# Patient Record
Sex: FEMALE | Race: WHITE | NOT HISPANIC OR LATINO | URBAN - METROPOLITAN AREA
[De-identification: names, ages, dates, MRNs, and addresses within clinical notes are randomized per-mention and may not be internally consistent; named-entity substitution may affect disease eponyms.]

---

## 2020-07-14 ENCOUNTER — INPATIENT (INPATIENT)
Facility: HOSPITAL | Age: 85
LOS: 2 days | Discharge: HOME | DRG: 312 | End: 2020-07-17
Attending: FAMILY MEDICINE | Admitting: FAMILY MEDICINE
Payer: MEDICARE

## 2020-07-14 VITALS
HEART RATE: 63 BPM | OXYGEN SATURATION: 100 % | RESPIRATION RATE: 16 BRPM | DIASTOLIC BLOOD PRESSURE: 87 MMHG | HEIGHT: 62 IN | TEMPERATURE: 98 F | SYSTOLIC BLOOD PRESSURE: 167 MMHG | WEIGHT: 130.07 LBS

## 2020-07-14 DIAGNOSIS — R41.82 ALTERED MENTAL STATUS, UNSPECIFIED: ICD-10-CM

## 2020-07-14 LAB
ALBUMIN SERPL ELPH-MCNC: 3.1 G/DL — LOW (ref 3.3–5)
ALP SERPL-CCNC: 80 U/L — SIGNIFICANT CHANGE UP (ref 40–120)
ALT FLD-CCNC: 20 U/L — SIGNIFICANT CHANGE UP (ref 12–78)
ANION GAP SERPL CALC-SCNC: 6 MMOL/L — SIGNIFICANT CHANGE UP (ref 5–17)
APTT BLD: 26.4 SEC — LOW (ref 27.5–35.5)
AST SERPL-CCNC: 20 U/L — SIGNIFICANT CHANGE UP (ref 15–37)
BASOPHILS # BLD AUTO: 0.05 K/UL — SIGNIFICANT CHANGE UP (ref 0–0.2)
BASOPHILS NFR BLD AUTO: 0.5 % — SIGNIFICANT CHANGE UP (ref 0–2)
BILIRUB SERPL-MCNC: 0.3 MG/DL — SIGNIFICANT CHANGE UP (ref 0.2–1.2)
BUN SERPL-MCNC: 22 MG/DL — SIGNIFICANT CHANGE UP (ref 7–23)
CALCIUM SERPL-MCNC: 8.7 MG/DL — SIGNIFICANT CHANGE UP (ref 8.5–10.1)
CHLORIDE SERPL-SCNC: 104 MMOL/L — SIGNIFICANT CHANGE UP (ref 96–108)
CO2 SERPL-SCNC: 24 MMOL/L — SIGNIFICANT CHANGE UP (ref 22–31)
CREAT SERPL-MCNC: 1.04 MG/DL — SIGNIFICANT CHANGE UP (ref 0.5–1.3)
D DIMER BLD IA.RAPID-MCNC: <150 NG/ML DDU — SIGNIFICANT CHANGE UP
EOSINOPHIL # BLD AUTO: 0.16 K/UL — SIGNIFICANT CHANGE UP (ref 0–0.5)
EOSINOPHIL NFR BLD AUTO: 1.5 % — SIGNIFICANT CHANGE UP (ref 0–6)
ETHANOL SERPL-MCNC: <10 MG/DL — SIGNIFICANT CHANGE UP (ref 0–10)
GLUCOSE SERPL-MCNC: 114 MG/DL — HIGH (ref 70–99)
HCT VFR BLD CALC: 32.3 % — LOW (ref 34.5–45)
HGB BLD-MCNC: 10.7 G/DL — LOW (ref 11.5–15.5)
IMM GRANULOCYTES NFR BLD AUTO: 1 % — SIGNIFICANT CHANGE UP (ref 0–1.5)
INR BLD: 1.02 RATIO — SIGNIFICANT CHANGE UP (ref 0.88–1.16)
LYMPHOCYTES # BLD AUTO: 2.64 K/UL — SIGNIFICANT CHANGE UP (ref 1–3.3)
LYMPHOCYTES # BLD AUTO: 25 % — SIGNIFICANT CHANGE UP (ref 13–44)
MCHC RBC-ENTMCNC: 28.9 PG — SIGNIFICANT CHANGE UP (ref 27–34)
MCHC RBC-ENTMCNC: 33.1 GM/DL — SIGNIFICANT CHANGE UP (ref 32–36)
MCV RBC AUTO: 87.3 FL — SIGNIFICANT CHANGE UP (ref 80–100)
MONOCYTES # BLD AUTO: 0.9 K/UL — SIGNIFICANT CHANGE UP (ref 0–0.9)
MONOCYTES NFR BLD AUTO: 8.5 % — SIGNIFICANT CHANGE UP (ref 2–14)
NEUTROPHILS # BLD AUTO: 6.72 K/UL — SIGNIFICANT CHANGE UP (ref 1.8–7.4)
NEUTROPHILS NFR BLD AUTO: 63.5 % — SIGNIFICANT CHANGE UP (ref 43–77)
PLATELET # BLD AUTO: 295 K/UL — SIGNIFICANT CHANGE UP (ref 150–400)
POTASSIUM SERPL-MCNC: 4.9 MMOL/L — SIGNIFICANT CHANGE UP (ref 3.5–5.3)
POTASSIUM SERPL-SCNC: 4.9 MMOL/L — SIGNIFICANT CHANGE UP (ref 3.5–5.3)
PROT SERPL-MCNC: 6.4 GM/DL — SIGNIFICANT CHANGE UP (ref 6–8.3)
PROTHROM AB SERPL-ACNC: 11.8 SEC — SIGNIFICANT CHANGE UP (ref 10.6–13.6)
RBC # BLD: 3.7 M/UL — LOW (ref 3.8–5.2)
RBC # FLD: 13.8 % — SIGNIFICANT CHANGE UP (ref 10.3–14.5)
SODIUM SERPL-SCNC: 134 MMOL/L — LOW (ref 135–145)
TROPONIN I SERPL-MCNC: <0.015 NG/ML — SIGNIFICANT CHANGE UP (ref 0.01–0.04)
WBC # BLD: 10.58 K/UL — HIGH (ref 3.8–10.5)
WBC # FLD AUTO: 10.58 K/UL — HIGH (ref 3.8–10.5)

## 2020-07-14 PROCEDURE — 99223 1ST HOSP IP/OBS HIGH 75: CPT

## 2020-07-14 PROCEDURE — 86900 BLOOD TYPING SEROLOGIC ABO: CPT

## 2020-07-14 PROCEDURE — 80048 BASIC METABOLIC PNL TOTAL CA: CPT

## 2020-07-14 PROCEDURE — 76770 US EXAM ABDO BACK WALL COMP: CPT

## 2020-07-14 PROCEDURE — 70450 CT HEAD/BRAIN W/O DYE: CPT | Mod: 26

## 2020-07-14 PROCEDURE — 93005 ELECTROCARDIOGRAM TRACING: CPT

## 2020-07-14 PROCEDURE — 93306 TTE W/DOPPLER COMPLETE: CPT

## 2020-07-14 PROCEDURE — 36415 COLL VENOUS BLD VENIPUNCTURE: CPT

## 2020-07-14 PROCEDURE — C9113: CPT

## 2020-07-14 PROCEDURE — 86850 RBC ANTIBODY SCREEN: CPT

## 2020-07-14 PROCEDURE — 87040 BLOOD CULTURE FOR BACTERIA: CPT

## 2020-07-14 PROCEDURE — 97116 GAIT TRAINING THERAPY: CPT | Mod: GP

## 2020-07-14 PROCEDURE — 86901 BLOOD TYPING SEROLOGIC RH(D): CPT

## 2020-07-14 PROCEDURE — 84484 ASSAY OF TROPONIN QUANT: CPT

## 2020-07-14 PROCEDURE — 85014 HEMATOCRIT: CPT

## 2020-07-14 PROCEDURE — U0003: CPT

## 2020-07-14 PROCEDURE — 81001 URINALYSIS AUTO W/SCOPE: CPT

## 2020-07-14 PROCEDURE — 71045 X-RAY EXAM CHEST 1 VIEW: CPT | Mod: 26

## 2020-07-14 PROCEDURE — 85018 HEMOGLOBIN: CPT

## 2020-07-14 PROCEDURE — 97163 PT EVAL HIGH COMPLEX 45 MIN: CPT | Mod: GP

## 2020-07-14 PROCEDURE — 86769 SARS-COV-2 COVID-19 ANTIBODY: CPT

## 2020-07-14 PROCEDURE — 87086 URINE CULTURE/COLONY COUNT: CPT

## 2020-07-14 PROCEDURE — 71275 CT ANGIOGRAPHY CHEST: CPT | Mod: 26

## 2020-07-14 PROCEDURE — 85027 COMPLETE CBC AUTOMATED: CPT

## 2020-07-14 PROCEDURE — 95819 EEG AWAKE AND ASLEEP: CPT

## 2020-07-14 RX ORDER — AMLODIPINE BESYLATE 2.5 MG/1
5 TABLET ORAL DAILY
Refills: 0 | Status: DISCONTINUED | OUTPATIENT
Start: 2020-07-14 | End: 2020-07-17

## 2020-07-14 RX ORDER — SODIUM CHLORIDE 9 MG/ML
1000 INJECTION INTRAMUSCULAR; INTRAVENOUS; SUBCUTANEOUS
Refills: 0 | Status: DISCONTINUED | OUTPATIENT
Start: 2020-07-14 | End: 2020-07-15

## 2020-07-14 RX ORDER — HYDROCHLOROTHIAZIDE 25 MG
25 TABLET ORAL DAILY
Refills: 0 | Status: DISCONTINUED | OUTPATIENT
Start: 2020-07-14 | End: 2020-07-15

## 2020-07-14 RX ORDER — AMLODIPINE BESYLATE 2.5 MG/1
1 TABLET ORAL
Qty: 0 | Refills: 0 | DISCHARGE

## 2020-07-14 RX ORDER — ALBUTEROL 90 UG/1
2 AEROSOL, METERED ORAL EVERY 6 HOURS
Refills: 0 | Status: DISCONTINUED | OUTPATIENT
Start: 2020-07-14 | End: 2020-07-17

## 2020-07-14 RX ORDER — ATENOLOL 25 MG/1
1 TABLET ORAL
Qty: 0 | Refills: 0 | DISCHARGE

## 2020-07-14 RX ORDER — FLUTICASONE FUROATE, UMECLIDINIUM BROMIDE AND VILANTEROL TRIFENATATE 200; 62.5; 25 UG/1; UG/1; UG/1
1 POWDER RESPIRATORY (INHALATION)
Qty: 0 | Refills: 0 | DISCHARGE

## 2020-07-14 RX ORDER — PANTOPRAZOLE SODIUM 20 MG/1
40 TABLET, DELAYED RELEASE ORAL
Refills: 0 | Status: DISCONTINUED | OUTPATIENT
Start: 2020-07-14 | End: 2020-07-15

## 2020-07-14 RX ORDER — OMEPRAZOLE 10 MG/1
1 CAPSULE, DELAYED RELEASE ORAL
Qty: 0 | Refills: 0 | DISCHARGE

## 2020-07-14 RX ORDER — ATENOLOL 25 MG/1
50 TABLET ORAL
Refills: 0 | Status: DISCONTINUED | OUTPATIENT
Start: 2020-07-14 | End: 2020-07-17

## 2020-07-14 RX ORDER — HEPARIN SODIUM 5000 [USP'U]/ML
5000 INJECTION INTRAVENOUS; SUBCUTANEOUS EVERY 12 HOURS
Refills: 0 | Status: DISCONTINUED | OUTPATIENT
Start: 2020-07-14 | End: 2020-07-15

## 2020-07-14 RX ADMIN — HEPARIN SODIUM 5000 UNIT(S): 5000 INJECTION INTRAVENOUS; SUBCUTANEOUS at 22:22

## 2020-07-14 RX ADMIN — ATENOLOL 50 MILLIGRAM(S): 25 TABLET ORAL at 22:22

## 2020-07-14 RX ADMIN — SODIUM CHLORIDE 100 MILLILITER(S): 9 INJECTION INTRAMUSCULAR; INTRAVENOUS; SUBCUTANEOUS at 19:22

## 2020-07-14 NOTE — H&P ADULT - HISTORY OF PRESENT ILLNESS
89 year old female patient who was recently discharged from [facility] for a UTI and hyponatremia presented to the ED after having a witnessed episode of unresponsiveness that lasted roughly 20 mins according to pt's daughter/HCP(Ej [phone]). Patient noted to have heart rate in the 30s and blood sugar reading of 134 at the time of EMS arrival. No head trauma, tongue biting, mouth foaming complaints of chest pain, palpitations or dizziness noted prior to event. Patient initially confused at the time of ED presentation but found fully A&O x3 and endorses above narrative. States she remembers being with her daughter and son-in-law then waking up in the ED. Patient currently endorses several episodes of loose stools that started in the ED and low abdominal cramping.

## 2020-07-14 NOTE — ED PROVIDER NOTE - PROGRESS NOTE DETAILS
given prolonged period of unresponsiveness with admit for further w/u and monitoring.  Further care per inpatient treatment team.

## 2020-07-14 NOTE — ED ADULT TRIAGE NOTE - CHIEF COMPLAINT QUOTE
Pt was brought in by ambulance for evaluation of syncope with period of unresponsiveness for 20-25 min while getting her hair done at daughters house, upon EMS arrival HR was reported to be 36 and on arrival to ED HR is 64. Blood glucose per EMS is 134, on arrival to ED pt is AMS and asking for daughter, unable to recall events PTA. Denies pain

## 2020-07-14 NOTE — H&P ADULT - ASSESSMENT
89 year old female patient with mental status changes and unresponsive episode        -Admit to Tele        #Concern for syncope  -pt with unresponsive episode and HR in the 30s  -no events noted on tele  -monitor on tele overnight  -get morning echo  -IVF hydration  -check orthostatics  -cardiology to evaluate pt    #Encephalopathy  -DDX: metabolic, infectious, neurologic  -pt with recent uti on abx  -CT head negative for any acute pathology  -currently lucid  -get blood and urine cx  -get EEG  -consider neuro evaluation if confusion returns    #Hyponatremia  -gentle IVF hydration    #Leukocytosis  -no focal signs of infection  -get blood and urine cx    #Anemia  -no baseline hemoglobin   -currently stable    #HTN  -on amlodipine   -on hctz-enalapril    #Debility  -PT evaluation    #Advanced directives  -full code  -HCP to discuss directives with family and notify rounding hospitalist of any changes    #DVT ppx  -heparin sq

## 2020-07-14 NOTE — ED ADULT NURSE NOTE - OBJECTIVE STATEMENT
Pt brought in by ambulance after an episode of unresponsiveness at home.  Upon EMS arrival patient was found to be bradycardic in the 30s but improved in route to normal rate in 60s.  Family called for collateral information.  Pt was recently admitted for ams/hyponatremia.

## 2020-07-14 NOTE — H&P ADULT - NSHPPHYSICALEXAM_GEN_ALL_CORE
Vital Signs Last 24 Hrs  T(C): 37.1 (14 Jul 2020 17:10), Max: 37.1 (14 Jul 2020 17:10)  T(F): 98.7 (14 Jul 2020 17:10), Max: 98.7 (14 Jul 2020 17:10)  HR: 60 (14 Jul 2020 17:10) (60 - 63)  BP: 126/75 (14 Jul 2020 17:10) (126/75 - 167/87)  BP(mean): --  RR: 19 (14 Jul 2020 17:10) (16 - 19)  SpO2: 99% (14 Jul 2020 17:10) (99% - 100%)

## 2020-07-14 NOTE — ED PROVIDER NOTE - PMH
No pertinent past medical history <<----- Click to add NO pertinent Past Medical History TIA (transient ischemic attack)

## 2020-07-14 NOTE — ED PROVIDER NOTE - CLINICAL SUMMARY MEDICAL DECISION MAKING FREE TEXT BOX
Pt presenting with episode of unresponsiveness. now responsive. No obvious evidence fo CVA. Does not have focal deficits currently. Consider syncopal episode, seizure, metabolic encephalopathy. Had recent admission at [facility] for hyponatremia. Consider infectious etiology. Pt not  a TPA candidate given presentation likely not CVA. No CTA at this time. Will check CTA chest given pt SOB and protracted syncopal event. Will dispo pending labs and imaging. Pt presenting with episode of unresponsiveness. now responsive. No obvious evidence of CVA. Does not have focal deficits currently. Consider syncopal episode, seizure, metabolic encephalopathy. Had recent admission at [facility] for hyponatremia. Consider infectious etiology. Pt not  a TPA candidate given presentation likely not CVA. No CTA at this time.  Symptoms rapidly resolving and later at mental baseline. Will check CTA chest given pt SOB and protracted syncopal event possibly. Will dispo pending labs and imaging.

## 2020-07-14 NOTE — ED PROVIDER NOTE - OBJECTIVE STATEMENT
89 y/o female with an unknown PMHx presents to the ED s/p syncope. Pt reports she was in her daughter's backyard getting a haircut and does not remember anything after. Upon EMS arrival, pt was sitting in the chair unresponsive with a HR of 36 and a BGM of 134. Pt still altered in ED. As per daughter, pt recently admitted at [facility] for hyponatremia and UTI and was d/c yesterday. No other complaints at this time. 89 y/o female with a PMHx of TIA 25 years ago presents to the ED s/p syncope. Pt reports she was in her daughter's backyard getting a haircut and does not remember anything after. Upon EMS arrival, pt was sitting in the chair unresponsive with a HR of 36 and a BGM of 134. Pt was unresponsive for about 30 mins. Upon ED arrival, pt initially altered. As per daughter, pt recently admitted at [facility] for hyponatremia and UTI, was d/c yesterday. No other complaints at this time. 89 y/o female with a PMHx of TIA 25 years ago presents to the ED s/p syncope. Pt reports she was in her daughter's backyard getting a haircut and does not remember anything after. Upon EMS arrival, pt was sitting in the chair unresponsive with a HR of 36 and a BGM of 134. Pt was unresponsive for about 30 mins. Upon ED arrival, pt initially altered. Pt c/o SOB. As per daughter, pt recently admitted at [facility] for hyponatremia and UTI, was d/c yesterday. No other complaints at this time.

## 2020-07-14 NOTE — H&P ADULT - NSICDXPASTMEDICALHX_GEN_ALL_CORE_FT
PAST MEDICAL HISTORY:  HTN (hypertension)     Overactive bladder     Transient ischemic attack (TIA)

## 2020-07-15 LAB
ADD ON TEST-SPECIMEN IN LAB: SIGNIFICANT CHANGE UP
APPEARANCE UR: CLEAR — SIGNIFICANT CHANGE UP
BACTERIA # UR AUTO: ABNORMAL
BILIRUB UR-MCNC: NEGATIVE — SIGNIFICANT CHANGE UP
COD CRY URNS QL: ABNORMAL
COLOR SPEC: YELLOW — SIGNIFICANT CHANGE UP
DIFF PNL FLD: ABNORMAL
EPI CELLS # UR: SIGNIFICANT CHANGE UP
GLUCOSE UR QL: NEGATIVE MG/DL — SIGNIFICANT CHANGE UP
HCT VFR BLD CALC: 29.6 % — LOW (ref 34.5–45)
HGB BLD-MCNC: 10.1 G/DL — LOW (ref 11.5–15.5)
KETONES UR-MCNC: NEGATIVE — SIGNIFICANT CHANGE UP
LEUKOCYTE ESTERASE UR-ACNC: ABNORMAL
NITRITE UR-MCNC: POSITIVE
PH UR: 5 — SIGNIFICANT CHANGE UP (ref 5–8)
PROT UR-MCNC: 30 MG/DL
RBC CASTS # UR COMP ASSIST: ABNORMAL /HPF (ref 0–4)
SARS-COV-2 IGG SERPL QL IA: NEGATIVE — SIGNIFICANT CHANGE UP
SARS-COV-2 IGM SERPL IA-ACNC: <0.1 INDEX — SIGNIFICANT CHANGE UP
SARS-COV-2 RNA SPEC QL NAA+PROBE: SIGNIFICANT CHANGE UP
SP GR SPEC: 1.01 — SIGNIFICANT CHANGE UP (ref 1.01–1.02)
TROPONIN I SERPL-MCNC: <0.015 NG/ML — SIGNIFICANT CHANGE UP (ref 0.01–0.04)
UROBILINOGEN FLD QL: 1 MG/DL
WBC UR QL: SIGNIFICANT CHANGE UP

## 2020-07-15 PROCEDURE — 99233 SBSQ HOSP IP/OBS HIGH 50: CPT

## 2020-07-15 PROCEDURE — 93010 ELECTROCARDIOGRAM REPORT: CPT

## 2020-07-15 PROCEDURE — 95819 EEG AWAKE AND ASLEEP: CPT | Mod: 26

## 2020-07-15 RX ORDER — PANTOPRAZOLE SODIUM 20 MG/1
40 TABLET, DELAYED RELEASE ORAL EVERY 12 HOURS
Refills: 0 | Status: DISCONTINUED | OUTPATIENT
Start: 2020-07-15 | End: 2020-07-16

## 2020-07-15 RX ADMIN — ATENOLOL 50 MILLIGRAM(S): 25 TABLET ORAL at 09:59

## 2020-07-15 RX ADMIN — AMLODIPINE BESYLATE 5 MILLIGRAM(S): 2.5 TABLET ORAL at 09:59

## 2020-07-15 RX ADMIN — Medication 10 MILLIGRAM(S): at 09:59

## 2020-07-15 RX ADMIN — PANTOPRAZOLE SODIUM 40 MILLIGRAM(S): 20 TABLET, DELAYED RELEASE ORAL at 18:51

## 2020-07-15 RX ADMIN — Medication 10 MILLIGRAM(S): at 22:31

## 2020-07-15 RX ADMIN — SODIUM CHLORIDE 100 MILLILITER(S): 9 INJECTION INTRAMUSCULAR; INTRAVENOUS; SUBCUTANEOUS at 16:01

## 2020-07-15 RX ADMIN — ATENOLOL 50 MILLIGRAM(S): 25 TABLET ORAL at 22:31

## 2020-07-15 RX ADMIN — PANTOPRAZOLE SODIUM 40 MILLIGRAM(S): 20 TABLET, DELAYED RELEASE ORAL at 12:48

## 2020-07-15 RX ADMIN — Medication 10 MILLIGRAM(S): at 16:01

## 2020-07-15 NOTE — PHYSICAL THERAPY INITIAL EVALUATION ADULT - PERTINENT HX OF CURRENT PROBLEM, REHAB EVAL
Pt admitted to  secondary to unresponsiveness x 20'. When EMS arrived pt with HR in 30's and blood sugar 134. Pt with recent admission to [facility] for UTI and hyponatremia. Pt admitted to  secondary to unresponsiveness x 20'. When EMS arrived pt with HR in 30's and blood sugar 134. Pt with recent admission to [facility] for UTI and hyponatremia. CT angio chest; neg for PE.

## 2020-07-15 NOTE — PHYSICAL THERAPY INITIAL EVALUATION ADULT - PRECAUTIONS/LIMITATIONS, REHAB EVAL
Tele/cardiac precautions fall precautions/cardiac precautions/Tele cardiac precautions/fall precautions/Tele, pt PUI/isolation precautions

## 2020-07-15 NOTE — PROGRESS NOTE ADULT - SUBJECTIVE AND OBJECTIVE BOX
CC: Syncope  Encephalopathy (15 Jul 2020 07:39)    HPI: 89 year old female patient  with PMH of HTN, TIA, Overactive blader  who was recently discharged from [facility] for a UTI and hyponatremia presented to the ED after having a witnessed episode of unresponsiveness that lasted roughly 20 mins according to pt's daughter/ HCP ( Margy [phone]).  Patient  noted to have heart rate in the 30s and blood sugar reading of 134 at the time of EMS arrival. No head trauma, tongue biting, mouth foaming complaints of chest pain, palpitations or dizziness noted prior to event. Patient initially confused at the time of ED presentation but found fully A&O x3 and endorses above narrative. States she remembers being with her daughter and son-in-law then waking up in the ED    INTERVAL HPI/ OVERNIGHT EVENTS: Chart reviewed, Pt was seen and examined, confirms history above, stated that was sitting in the chair when it happened.  Denies any preceding dizziness , lightheadedness or palpitations. Feels better today. Had  episode of rectal bleeding this am, resolved. Had  significant diarrhea yesterday in ED. Denies abd pain     Vital Signs Last 24 Hrs  T(C): 36.6 (15 Jul 2020 08:30), Max: 37.1 (2020 17:10)  T(F): 97.8 (15 Jul 2020 08:30), Max: 98.7 (2020 17:10)  HR: 61 (15 Jul 2020 08:30) (60 - 72)  BP: 145/41 (15 Jul 2020 08:35) (126/75 - 147/61)  RR: 18 (15 Jul 2020 08:30) (16 - 19)  SpO2: 97% (15 Jul 2020 08:30) (97% - 100%)      REVIEW OF SYSTEMS:  All other review of systems is negative unless indicated above.      PHYSICAL EXAM:  General: Well developed; frail elderly female,  in no acute distress  Eyes: PERRLA, EOMI; conjunctiva and sclera clear  Head: Normocephalic; atraumatic  ENMT: No nasal discharge; airway clear  Neck: Supple; non tender; no masses  Respiratory: No wheezes, rales or rhonchi  Cardiovascular: Regular rate and rhythm. S1 and S2 Normal;  + murmurs  Gastrointestinal: Soft non-tender non-distended; Normal bowel sounds  Genitourinary: No  suprapubic  tenderness  Extremities: preserved  range of motion, No  edema  Vascular: Peripheral pulses palpable 2+ bilaterally  Neurological: Alert and oriented x3, non focal   Skin: Warm and dry. No acute rash  Musculoskeletal: Normal muscle tone, no joint tenderness   Psychiatric: Cooperative       LABS:     CARDIAC MARKERS ( 15 Jul 2020 06:31 )  <0.015 ng/mL / x     / x     / x     / x      CARDIAC MARKERS ( 2020 12:50 )  <0.015 ng/mL / x     / x     / x     / x                            10.6   18.13 )-----------( 258      ( 15 Jul 2020 06:31 )             31.8     15 Jul 2020 06:31    136    |  107    |  23     ----------------------------<  100    4.0     |  23     |  1.00     Ca    8.1        15 Jul 2020 06:31    TPro  6.4    /  Alb  3.1    /  TBili  0.3    /  DBili  x      /  AST  20     /  ALT  20     /  AlkPhos  80     2020 12:50  PT/INR - ( 2020 12:50 )   PT: 11.8 sec;   INR: 1.02 ratio    PTT - ( 2020 12:50 )  PTT:26.4 sec  POCT Blood Glucose.: 107 mg/dL (2020 13:09)    LIVER FUNCTIONS - ( 2020 12:50 )  Alb: 3.1 g/dL / Pro: 6.4 gm/dL / ALK PHOS: 80 U/L / ALT: 20 U/L / AST: 20 U/L / GGT: x           Urinalysis Basic - ( 2020 23:45 )  Color: Yellow / Appearance: Clear / S.010 / pH: x  Gluc: x / Ketone: Negative  / Bili: Negative / Urobili: 1 mg/dL   Blood: x / Protein: 30 mg/dL / Nitrite: Positive   Leuk Esterase: Trace / RBC: 6-10 /HPF / WBC 3-5   Sq Epi: x / Non Sq Epi: Few / Bacteria: Moderate        MEDICATIONS  (STANDING):  amLODIPine   Tablet 5 milliGRAM(s) Oral daily  ATENolol  Tablet 50 milliGRAM(s) Oral two times a day  enalapril 10 milliGRAM(s) Oral daily  pantoprazole    Tablet 40 milliGRAM(s) Oral before breakfast  sildenafil (REVATIO) 10 milliGRAM(s) Oral every 8 hours  sodium chloride 0.9%. 1000 milliLiter(s) (100 mL/Hr) IV Continuous <Continuous>    MEDICATIONS  (PRN):  ALBUTerol    90 MICROgram(s) HFA Inhaler 2 Puff(s) Inhalation every 6 hours PRN Shortness of Breath and/or Wheezing      RADIOLOGY & ADDITIONAL TESTS:    EXAM:  CTA CHEST PE PROTOCOL (W)AW IC                        PROCEDURE DATE:  2020    INTERPRETATION:  CLINICAL INFORMATION: Syncope and shortness of breath    COMPARISON: None.  FINDINGS:  LUNGS AND AIRWAYS: Patent central airways.  No suspicious nodule, mass or acute consolidation. Right lower lobe calcified granuloma.  PLEURA: No pleural effusion.  MEDIASTINUM AND PRUDENCIO: No lymphadenopathy. Large hiatal hernia.  VESSELS: Excellent pulmonary arterial opacification. No evidence of embolism. Normal caliber aorta. Atherosclerotic arterial calcifications, including coronary artery calcification.  HEART: Heart size is normal. No pericardial effusion.  CHEST WALL AND LOWER NECK: Within normal limits.  VISUALIZED UPPER ABDOMEN: Within normal limits.  BONES: No acute abnormality.    IMPRESSION:   No pulmonary embolism or other acute chest pathology.        EXAM:  CT BRAIN STROKE PROTOCOL                        PROCEDURE DATE:  2020    INTERPRETATION:      FINDINGS:   No previous examinations are available for review.  The brain demonstrates moderate periventricular and deep white matter ischemia.   No acute cerebral cortical infarct is seen.  No intracranial hemorrhage is found.  No mass effect is found in the brain.    The ventricles, sulci and basal cisterns appear unremarkable.  The orbits are unremarkable.  The paranasal sinuses are clear.  The nasal cavity appears intact.  The nasopharynx is symmetric.  The central skull base, petrous temporal bones and calvarium remain intact.      IMPRESSION:   Moderate periventricular and deep white matter ischemia.

## 2020-07-15 NOTE — PHYSICAL THERAPY INITIAL EVALUATION ADULT - PLANNED THERAPY INTERVENTIONS, PT EVAL
ROM/strengthening/Increase mobility as tolerated. Eval, bed mobility, transfers x 15'./transfer training/bed mobility training

## 2020-07-15 NOTE — PROGRESS NOTE ADULT - ASSESSMENT
89 year old female patient  with PMH of HTN, TIA, Overactive blader     # Syncope, possibly orthostatic vs vasovagal   -pt with unresponsive episode and HR in the 30s at EMS arrival   -tele: SR- SBrady in 50s   -Trop neg x 2   -get morning echo  -Orthostatics positive  - C/w IVF hydration  - Stop HCTZ   -check orthostatics  - ECHO   - PT   - Cardio  eval appreciated     #Encephalopathy, metabolic, at baseline now   -CT head negative for any acute pathology  -IV Fluids   - EEG  - Supportive care   - FAll precautions  - PT eval       #Hyponatremia, improved   -gentle IVF hydration    #Leukocytosis? Unclear  etiology  - WBCs trending up, possibly reactive Vs UTI  - F/u BCx  - Will send UCX   - CT angio no PE or PNA   - monitor for fevers, will watch off abxs     #Anemia with episode of Rectal bleed  Unknown baseline HB  Start PPI IV but likely lower GI bleed  Hold DVT PPX   GI eval   Repeat H/H and Type & screen this afternoon  Transfuse PRN       #HTN  -on amlodipine, enalapril  - Hold HCTZ     #Debility  -PT evaluation    #Advanced directives  -full code  -HCP to discuss directives with family and notify rounding hospitalist of any changes    #DVT ppx  -Venodynes

## 2020-07-15 NOTE — PHYSICAL THERAPY INITIAL EVALUATION ADULT - ACTIVE RANGE OF MOTION EXAMINATION, REHAB EVAL
Left LE Active ROM was WFL (within functional limits)/Right LE Active ROM was WFL (within functional limits)/Right UE Active ROM was WFL (within functional limits)/Left UE Active ROM was WFL (within functional limits)/Bilateral shoulder flex ~ 120 degrees, bilateral hip flex ~ 90 degrees, and bilateral DF neutral./deficits as listed below

## 2020-07-15 NOTE — PHYSICAL THERAPY INITIAL EVALUATION ADULT - LEVEL OF INDEPENDENCE: SUPINE/SIT, REHAB EVAL
minimum assist (75% patients effort)/Pt sat at EOB for ~ 3' and therapist noted blood all over pad. Pt c/o dizziness and encouraged pt back to bed with above assistance. RN informed./contact guard

## 2020-07-16 LAB
ANION GAP SERPL CALC-SCNC: 4 MMOL/L — LOW (ref 5–17)
BUN SERPL-MCNC: 14 MG/DL — SIGNIFICANT CHANGE UP (ref 7–23)
CALCIUM SERPL-MCNC: 8.6 MG/DL — SIGNIFICANT CHANGE UP (ref 8.5–10.1)
CHLORIDE SERPL-SCNC: 106 MMOL/L — SIGNIFICANT CHANGE UP (ref 96–108)
CO2 SERPL-SCNC: 26 MMOL/L — SIGNIFICANT CHANGE UP (ref 22–31)
CREAT SERPL-MCNC: 0.83 MG/DL — SIGNIFICANT CHANGE UP (ref 0.5–1.3)
CULTURE RESULTS: NO GROWTH — SIGNIFICANT CHANGE UP
GLUCOSE SERPL-MCNC: 95 MG/DL — SIGNIFICANT CHANGE UP (ref 70–99)
HCT VFR BLD CALC: 28.8 % — LOW (ref 34.5–45)
HGB BLD-MCNC: 9.6 G/DL — LOW (ref 11.5–15.5)
MCHC RBC-ENTMCNC: 29.1 PG — SIGNIFICANT CHANGE UP (ref 27–34)
MCHC RBC-ENTMCNC: 33.3 GM/DL — SIGNIFICANT CHANGE UP (ref 32–36)
MCV RBC AUTO: 87.3 FL — SIGNIFICANT CHANGE UP (ref 80–100)
PLATELET # BLD AUTO: 222 K/UL — SIGNIFICANT CHANGE UP (ref 150–400)
POTASSIUM SERPL-MCNC: 3.7 MMOL/L — SIGNIFICANT CHANGE UP (ref 3.5–5.3)
POTASSIUM SERPL-SCNC: 3.7 MMOL/L — SIGNIFICANT CHANGE UP (ref 3.5–5.3)
RBC # BLD: 3.3 M/UL — LOW (ref 3.8–5.2)
RBC # FLD: 14.2 % — SIGNIFICANT CHANGE UP (ref 10.3–14.5)
SODIUM SERPL-SCNC: 136 MMOL/L — SIGNIFICANT CHANGE UP (ref 135–145)
SPECIMEN SOURCE: SIGNIFICANT CHANGE UP
WBC # BLD: 20.88 K/UL — HIGH (ref 3.8–10.5)
WBC # FLD AUTO: 20.88 K/UL — HIGH (ref 3.8–10.5)

## 2020-07-16 PROCEDURE — 76770 US EXAM ABDO BACK WALL COMP: CPT | Mod: 26

## 2020-07-16 PROCEDURE — 93010 ELECTROCARDIOGRAM REPORT: CPT

## 2020-07-16 PROCEDURE — 99233 SBSQ HOSP IP/OBS HIGH 50: CPT

## 2020-07-16 RX ADMIN — Medication 10 MILLIGRAM(S): at 10:37

## 2020-07-16 RX ADMIN — Medication 10 MILLIGRAM(S): at 15:29

## 2020-07-16 RX ADMIN — AMLODIPINE BESYLATE 5 MILLIGRAM(S): 2.5 TABLET ORAL at 10:37

## 2020-07-16 RX ADMIN — Medication 10 MILLIGRAM(S): at 22:18

## 2020-07-16 RX ADMIN — ATENOLOL 50 MILLIGRAM(S): 25 TABLET ORAL at 10:37

## 2020-07-16 RX ADMIN — PANTOPRAZOLE SODIUM 40 MILLIGRAM(S): 20 TABLET, DELAYED RELEASE ORAL at 05:17

## 2020-07-16 RX ADMIN — ATENOLOL 50 MILLIGRAM(S): 25 TABLET ORAL at 22:18

## 2020-07-16 RX ADMIN — Medication 10 MILLIGRAM(S): at 05:17

## 2020-07-16 NOTE — PROGRESS NOTE ADULT - ASSESSMENT
Syncope- orthostatic hypotension.  no events on tele so far.  She still appears hypovolemic on exam.  Recommend gentle hydration.   Echo results as stated above.    HTN-continue current meds.  no on any diuretics.    Other medical issues- Management per primary team.   Thank you for allowing me to participate in the care of this patient. Please feel free to contact me with any questions.

## 2020-07-16 NOTE — PROGRESS NOTE ADULT - ASSESSMENT
89 year old female patient  with PMH of HTN, TIA, Overactive blader     # Syncope, possibly orthostatic vs vasovagal   - pt with unresponsive episode and HR in the 30s at EMS arrival   - tele: SR- SBrady in 50s   -Trop neg x 2   - get morning echo  - Orthostatics positive  - C/w IVF hydration  - Stop HCTZ   - check orthostatics  - ECHO   - PT   - daughter has been updated    # Encephalopathy, metabolic, at baseline now   - CT head negative for any acute pathology  - IV Fluids   - EEG  - Supportive care   - Fall precautions  - PT eval     # Hyponatremia, improved   -gentle IVF hydration    # Leukocytosis? Unclear  etiology  - WBCs trending up, possibly reactive Vs UTI  - F/u BCx  - Will send UCX   - CT angio no PE or PNA   - monitor for fevers, will watch off abxs     # Anemia with episode of Rectal bleed  - Anal rectal bleeding.  - GI evaluation appreciated. Currently no signs of bleeding    # HTN  - on amlodipine, enalapril  - Hold HCTZ     # Debility  -PT evaluation    # Advanced directives  -full code  -HCP to discuss directives with family and notify rounding hospitalist of any changes

## 2020-07-16 NOTE — CONSULT NOTE ADULT - ASSESSMENT
A/P: 89 year old female patient with mental status changes and unresponsive episode.    1. Syncope. Check orthostatics. Check 2Decho.  No events on tele so far.   Cont IVF for now.  May be related to recent sepsis. CTH negative.    2. Sepsis. Recent UTI. Cx pending.   Abx as per primary team.    3. Hyponatremia. Mild at present. Cont IVF.    4. HTN. Stable- cont to follow. Will adjust meds as needed.     5. DVT proph. Outpt f/u upon D/C.
89 y/o female with h/o HTN, overactive bladder, TIA was admitted on 7/14 after a syncopal episode. She was recently discharged from [facility] for a UTI and hyponatremia presented to the ED after having a witnessed episode of unresponsiveness that lasted roughly 20 mins according to pt's daughter. Patient noted to have heart rate in the 30s and blood sugar reading of 134 at the time of EMS arrival. No head trauma, tongue biting, mouth foaming complaints of chest pain, palpitations or dizziness noted prior to event. Patient initially confused at the time of ED presentation but later she became A&O x3 Patient had several episodes of loose stools in the ED and low abdominal cramping.     1. Syncopal episodeLow grade fever. Leukocytosis. ?cause  -had episode of loose stools and had recently been on abx therapy for a urinary infection; she is at risk for CDAD  -monitor BM pattern and abdomen  -no pyuria; urinary infection is unlikely  -no clinical evidence of pneumonia  -obtain BC x 2, urine c/s  -would send stool CDT if loose stools are recurrent  -would observe off abx for now  -old chart reviewed to assess prior cultures  -monitor temps  -f/u CBC  -supportive care  2. Other issues:   -care per medicine
Anal rectal bleeding.  Differential discussed with patient.  Possibility of a more proximal lesion also discussed.  Understanding this and excepting risk of misdiagnosis including that of cancer, patient does not want a colonoscopy at this time.    Discussed with RN, we will clinically follow and see if there is any further bleeding or significant drop in hematocrit.  Syncope- orthostatic hypotension.

## 2020-07-16 NOTE — PROGRESS NOTE ADULT - SUBJECTIVE AND OBJECTIVE BOX
Patient is a 89y old  Female who presents with a chief complaint of Syncope  Encephalopathy       HPI:  89 year old female patient who was recently discharged from [facility] for a UTI and hyponatremia presented to the ED after having a witnessed episode of unresponsiveness.     Pt seen and examined by me this am. She denies any symptoms.  Overnight  no events.           PAST MEDICAL & SURGICAL HISTORY:  TIA (transient ischemic attack)  Overactive bladder  Transient ischemic attack (TIA)  HTN (hypertension)      MEDICATIONS  (STANDING):  amLODIPine   Tablet 5 milliGRAM(s) Oral daily  ATENolol  Tablet 50 milliGRAM(s) Oral two times a day  enalapril 10 milliGRAM(s) Oral daily  pantoprazole  Injectable 40 milliGRAM(s) IV Push every 12 hours  sildenafil (REVATIO) 10 milliGRAM(s) Oral every 8 hours    MEDICATIONS  (PRN):  ALBUTerol    90 MICROgram(s) HFA Inhaler 2 Puff(s) Inhalation every 6 hours PRN Shortness of Breath and/or Wheezing      FAMILY HISTORY:      SOCIAL HISTORY: no recent smoking     REVIEW OF SYSTEMS:  CONSTITUTIONAL:    No fatigue, malaise, lethargy.  No fever or chills.   RESPIRATORY:  No cough.  No wheeze.  No hemoptysis.  No shortness of breath.  CARDIOVASCULAR:  No chest pains.  No palpitations. No shortness of breath, No orthopnea or PND.  GASTROINTESTINAL:  No abdominal pain.  No nausea or vomiting.    GENITOURINARY:    No hematuria.    MUSCULOSKELETAL:  No musculoskeletal pain.  No joint swelling.  No arthritis.  NEUROLOGICAL:  No tingling or numbness or weakness.  PSYCHIATRIC:  No confusion  SKIN:  No rashes.          Vital Signs Last 24 Hrs  T(C): 36.4 (2020 10:01), Max: 37.2 (15 Jul 2020 15:59)  T(F): 97.6 (2020 10:01), Max: 99 (15 Jul 2020 15:59)  HR: 71 (15 Jul 2020 22:20) (68 - 72)  BP: 133/39 (15 Jul 2020 22:20) (126/39 - 146/54)  BP(mean): --  RR: 18 (2020 05:12) (17 - 18)  SpO2: 100% (2020 05:12) (99% - 100%)    PHYSICAL EXAM-    Constitutional: The patient appears to be normal, well developed, well nourished and alert and oriented to time, place and person. The patient does not appear acutely ill.     Head: Head is normocephalic and atraumatic.      Neck: No jugular venous distention. No audible carotid bruits. There are strong carotid pulses bilaterally. No JVD.     Cardiovascular: Regular rate and rhythm without S3, S4. No murmurs or rubs are appreciated.      Respiratory: Breathsounds are normal. No rales. No wheezing.    Abdomen: Soft, nontender, nondistended with positive bowel sounds.      Extremity: No tenderness. No  pitting edema     Neurologic: The patient is alert and oriented.      Skin: No rash, no obvious lesions noted.      Psychiatric: The patient appears to be emotionally stable.      INTERPRETATION OF TELEMETRY: SR now,     ECG:     I&O's Detail    15 Jul 2020 07:01  -  2020 07:00  --------------------------------------------------------  IN:    Oral Fluid: 240 mL  Total IN: 240 mL    OUT:    Voided: 125 mL  Total OUT: 125 mL    Total NET: 115 mL          LABS:                        9.6    20.88 )-----------( 222      ( 2020 07:04 )             28.8     07-16    136  |  106  |  14  ----------------------------<  95  3.7   |  26  |  0.83    Ca    8.6      2020 07:04    TPro  6.4  /  Alb  3.1<L>  /  TBili  0.3  /  DBili  x   /  AST  20  /  ALT  20  /  AlkPhos  80  07-14    CARDIAC MARKERS ( 15 Jul 2020 06:31 )  <0.015 ng/mL / x     / x     / x     / x      CARDIAC MARKERS ( 2020 12:50 )  <0.015 ng/mL / x     / x     / x     / x          PT/INR - ( 2020 12:50 )   PT: 11.8 sec;   INR: 1.02 ratio         PTT - ( 2020 12:50 )  PTT:26.4 sec  Urinalysis Basic - ( 2020 23:45 )    Color: Yellow / Appearance: Clear / S.010 / pH: x  Gluc: x / Ketone: Negative  / Bili: Negative / Urobili: 1 mg/dL   Blood: x / Protein: 30 mg/dL / Nitrite: Positive   Leuk Esterase: Trace / RBC: 6-10 /HPF / WBC 3-5   Sq Epi: x / Non Sq Epi: Few / Bacteria: Moderate      I&O's Summary    15 Jul 2020 07:01  -  2020 07:00  --------------------------------------------------------  IN: 240 mL / OUT: 125 mL / NET: 115 mL      BNP  RADIOLOGY & ADDITIONAL STUDIES:  < from: CT Angio Chest PE Protocol w/ IV Cont (20 @ 15:35) >    EXAM:  CTA CHEST PE PROTOCOL (W)AW IC                            PROCEDURE DATE:  2020          INTERPRETATION:  CLINICAL INFORMATION: Syncope and shortness of breath    COMPARISON: None.    PROCEDURE:   CT Angiography of the Chest.  90 ml ofOmnipaque 350 was injected intravenously. 10 ml were discarded.  Sagittal and coronal reformats were performed as well as 3D (MIP) reconstructions.    FINDINGS:    LUNGS AND AIRWAYS: Patent central airways.  No suspicious nodule, mass or acute consolidation. Right lower lobe calcified granuloma.  PLEURA: No pleural effusion.  MEDIASTINUM AND PRUDENCIO: No lymphadenopathy. Large hiatal hernia.  VESSELS: Excellent pulmonary arterial opacification. No evidence of embolism. Normal caliber aorta. Atherosclerotic arterial calcifications, including coronary artery calcification.  HEART: Heart size is normal. No pericardial effusion.  CHEST WALL AND LOWER NECK: Within normal limits.  VISUALIZED UPPER ABDOMEN: Within normal limits.  BONES: No acute abnormality.    IMPRESSION:   No pulmonary embolism or other acute chest pathology.    < end of copied text >  < from: TTE Echo Complete w/o Contrast w/ Doppler (07.15.20 @ 11:56) >   Impression     Summary     The left ventricle was not well visualized.   Normal left ventricular size.   Left ventricular wall motion is normal.   Estimated left ventricular ejection fraction is 60 %.   Mild diastolic dysfunction (reversal of mitral inflow)   with borderline elevated LV filling pressures   The left atrium is mildly dilated.   Trivial aortic insufficiency   Mild mitral regurgitation   Moderate Tricuspid regurgitation   Moderate pulmonary hypertension.     Signature     ----------------------------------------------------------------   Electronically signed by Jeremy Harp MD(Interpreting   physician) on 07/15/2020 05:47 PM   ----------------------------------------------------------------    < end of copied text >

## 2020-07-16 NOTE — PROGRESS NOTE ADULT - SUBJECTIVE AND OBJECTIVE BOX
CC: Syncope  Encephalopathy (15 Jul 2020 07:39)    HPI: 89 year old female patient  with PMH of HTN, TIA, Overactive blader  who was recently discharged from [facility] for a UTI and hyponatremia presented to the ED after having a witnessed episode of unresponsiveness that lasted roughly 20 mins according to pt's daughter/ HCP ( Margy [phone]).  Patient  noted to have heart rate in the 30s and blood sugar reading of 134 at the time of EMS arrival. No head trauma, tongue biting, mouth foaming complaints of chest pain, palpitations or dizziness noted prior to event. Patient initially confused at the time of ED presentation but found fully A&O x3 and endorses above narrative. States she remembers being with her daughter and son-in-law then waking up in the ED    INTERVAL HPI/ OVERNIGHT EVENTS: Chart reviewed, Pt was seen and examined, confirms history above, stated that was sitting in the chair when it happened.  Denies any preceding dizziness , lightheadedness or palpitations. Feels better today. Had  episode of rectal bleeding this am, resolved. Had  significant diarrhea yesterday in ED. Denies abd pain     REVIEW OF SYSTEMS:  All other review of systems is negative unless indicated above.    PHYSICAL EXAM:  General: Well developed; frail elderly female,  in no acute distress  Eyes: PERRLA, EOMI; conjunctiva and sclera clear  Head: Normocephalic; atraumatic  ENMT: No nasal discharge; airway clear  Neck: Supple; non tender; no masses  Respiratory: No wheezes, rales or rhonchi  Cardiovascular: Regular rate and rhythm. S1 and S2 Normal;  + murmurs  Gastrointestinal: Soft non-tender non-distended; Normal bowel sounds  Genitourinary: No  suprapubic  tenderness  Extremities: preserved  range of motion, No  edema  Vascular: Peripheral pulses palpable 2+ bilaterally  Neurological: Alert and oriented x3, non focal   Skin: Warm and dry. No acute rash  Musculoskeletal: Normal muscle tone, no joint tenderness   Psychiatric: Cooperative         CBC Full  -  ( 2020 07:04 )  WBC Count : 20.88 K/uL  RBC Count : 3.30 M/uL  Hemoglobin : 9.6 g/dL  Hematocrit : 28.8 %  Platelet Count - Automated : 222 K/uL  Mean Cell Volume : 87.3 fl  Mean Cell Hemoglobin : 29.1 pg  Mean Cell Hemoglobin Concentration : 33.3 gm/dL  Auto Neutrophil # : x  Auto Lymphocyte # : x  Auto Monocyte # : x  Auto Eosinophil # : x  Auto Basophil # : x  Auto Neutrophil % : x  Auto Lymphocyte % : x  Auto Monocyte % : x  Auto Eosinophil % : x  Auto Basophil % : x    PT/INR - ( 2020 12:50 )   PT: 11.8 sec;   INR: 1.02 ratio         PTT - ( 2020 12:50 )  PTT:26.4 sec  Urinalysis Basic - ( 2020 23:45 )    Color: Yellow / Appearance: Clear / S.010 / pH: x  Gluc: x / Ketone: Negative  / Bili: Negative / Urobili: 1 mg/dL   Blood: x / Protein: 30 mg/dL / Nitrite: Positive   Leuk Esterase: Trace / RBC: 6-10 /HPF / WBC 3-5   Sq Epi: x / Non Sq Epi: Few / Bacteria: Moderate      07-16    136  |  106  |  14  ----------------------------<  95  3.7   |  26  |  0.83    Ca    8.6      2020 07:04    TPro  6.4  /  Alb  3.1<L>  /  TBili  0.3  /  DBili  x   /  AST  20  /  ALT  20  /  AlkPhos  80  07-14                  RADIOLOGY & ADDITIONAL TESTS:    EXAM:  CTA CHEST PE PROTOCOL (W)AW IC                        PROCEDURE DATE:  2020    INTERPRETATION:  CLINICAL INFORMATION: Syncope and shortness of breath    COMPARISON: None.  FINDINGS:  LUNGS AND AIRWAYS: Patent central airways.  No suspicious nodule, mass or acute consolidation. Right lower lobe calcified granuloma.  PLEURA: No pleural effusion.  MEDIASTINUM AND PRUDENCIO: No lymphadenopathy. Large hiatal hernia.  VESSELS: Excellent pulmonary arterial opacification. No evidence of embolism. Normal caliber aorta. Atherosclerotic arterial calcifications, including coronary artery calcification.  HEART: Heart size is normal. No pericardial effusion.  CHEST WALL AND LOWER NECK: Within normal limits.  VISUALIZED UPPER ABDOMEN: Within normal limits.  BONES: No acute abnormality.    IMPRESSION:   No pulmonary embolism or other acute chest pathology.        EXAM:  CT BRAIN STROKE PROTOCOL                        PROCEDURE DATE:  2020    INTERPRETATION:      FINDINGS:   No previous examinations are available for review.  The brain demonstrates moderate periventricular and deep white matter ischemia.   No acute cerebral cortical infarct is seen.  No intracranial hemorrhage is found.  No mass effect is found in the brain.    The ventricles, sulci and basal cisterns appear unremarkable.  The orbits are unremarkable.  The paranasal sinuses are clear.  The nasal cavity appears intact.  The nasopharynx is symmetric.  The central skull base, petrous temporal bones and calvarium remain intact.      IMPRESSION:   Moderate periventricular and deep white matter ischemia.

## 2020-07-16 NOTE — CONSULT NOTE ADULT - SUBJECTIVE AND OBJECTIVE BOX
Cardiology Consultation    HPI: 89 year old female patient who was recently discharged from [facility] for a UTI and hyponatremia presented to the ED after having a witnessed episode of unresponsiveness that lasted roughly 20 mins according to pt's daughter/HCP(Ej [phone]). Patient noted to have heart rate in the 30s and blood sugar reading of 134 at the time of EMS arrival. No head trauma, tongue biting, mouth foaming complaints of chest pain, palpitations or dizziness noted prior to event. Patient initially confused at the time of ED presentation but found fully A&O x3 and endorses above narrative. States she remembers being with her daughter and son-in-law then waking up in the ED. Patient currently endorses several episodes of loose stools that started in the ED and low abdominal cramping. (2020 17:19)    7/15. Seen/examined on 3N. SR on tele.   No CP/SOB. Lying flat in bed.     PAST MEDICAL & SURGICAL HISTORY:  Overactive bladder  Transient ischemic attack (TIA)  HTN (hypertension)    Allergies  No Known Allergies    SOCIAL HISTORY: Denies tobacco, etoh abuse or illicit drug use    FAMILY HISTORY: Noncontributory    MEDICATIONS  (STANDING):  amLODIPine   Tablet 5 milliGRAM(s) Oral daily  ATENolol  Tablet 50 milliGRAM(s) Oral two times a day  enalapril 10 milliGRAM(s) Oral daily  heparin   Injectable 5000 Unit(s) SubCutaneous every 12 hours  hydrochlorothiazide 25 milliGRAM(s) Oral daily  pantoprazole    Tablet 40 milliGRAM(s) Oral before breakfast  sildenafil   Oral Tab/Cap - Peds 12.5 milliGRAM(s) Oral three times a day  sodium chloride 0.9%. 1000 milliLiter(s) (100 mL/Hr) IV Continuous <Continuous>    MEDICATIONS  (PRN):  ALBUTerol    90 MICROgram(s) HFA Inhaler 2 Puff(s) Inhalation every 6 hours PRN Shortness of Breath and/or Wheezing    Vital Signs Last 24 Hrs  T(C): 36.7 (2020 20:52), Max: 37.1 (2020 17:10)  T(F): 98 (2020 20:52), Max: 98.7 (2020 17:10)  HR: 72 (2020 20:52) (60 - 72)  BP: 147/61 (2020 20:52) (126/75 - 167/87)  BP(mean): --  RR: 16 (2020 20:52) (16 - 19)  SpO2: 98% (2020 20:52) (98% - 100%)    REVIEW OF SYSTEMS:    CONSTITUTIONAL:  As per HPI. ?Syncope  HEENT:  Eyes:  No diplopia or blurred vision. ENT:  No earache, sore throat or runny nose.  CARDIOVASCULAR:  No pressure, squeezing, strangling, tightness, heaviness or aching about the chest, neck, axilla or epigastrium.  RESPIRATORY:  No cough, shortness of breath, PND or orthopnea.  GASTROINTESTINAL:  No nausea, vomiting or diarrhea.  MUSCULOSKELETAL:  As per HPI.    PHYSICAL EXAMINATION:    GENERAL APPEARANCE:  Pt. is not currently dyspneic, in no distress. Pt. is alert, oriented, and pleasant.  HEENT:  Pupils are normal and react normally. No icterus. Mucous membranes well colored.  NECK:  Supple. No lymphadenopathy. Jugular venous pressure not elevated. Carotids equal.   HEART:   The cardiac impulse has a normal quality. There are no murmurs, rubs or gallops noted  CHEST:  Chest is clear to auscultation. Normal respiratory effort.  ABDOMEN:  Soft and nontender.   EXTREMITIES:  There is no edema.   SKIN:  No rash or significant lesions are noted.    I&O's Summary    2020 07:01  -  15 Jul 2020 07:00  --------------------------------------------------------  IN: 920 mL / OUT: 200 mL / NET: 720 mL    LABS:                        10.7   10.58 )-----------( 295      ( 2020 12:50 )             32.3     07-14    134<L>  |  104  |  22  ----------------------------<  114<H>  4.9   |  24  |  1.04    Ca    8.7      2020 12:50    TPro  6.4  /  Alb  3.1<L>  /  TBili  0.3  /  DBili  x   /  AST  20  /  ALT  20  /  AlkPhos  80  07-14    LIVER FUNCTIONS - ( 2020 12:50 )  Alb: 3.1 g/dL / Pro: 6.4 gm/dL / ALK PHOS: 80 U/L / ALT: 20 U/L / AST: 20 U/L / GGT: x           PT/INR - ( 2020 12:50 )   PT: 11.8 sec;   INR: 1.02 ratio       PTT - ( 2020 12:50 )  PTT:26.4 sec  CARDIAC MARKERS ( 2020 12:50 )  <0.015 ng/mL / x     / x     / x     / x        Urinalysis Basic - ( 2020 23:45 )    Color: Yellow / Appearance: Clear / S.010 / pH: x  Gluc: x / Ketone: Negative  / Bili: Negative / Urobili: 1 mg/dL   Blood: x / Protein: 30 mg/dL / Nitrite: Positive   Leuk Esterase: Trace / RBC: 6-10 /HPF / WBC 3-5   Sq Epi: x / Non Sq Epi: Few / Bacteria: Moderate    EKG: < from: 12 Lead ECG (20 @ 12:40) >   Normal sinus rhythm  Left axis deviation  Minimal voltage criteria for LVH, may be normal variant  Anteroseptal infarct , age undetermined  Abnormal ECG     TELEMETRY: SR    CARDIAC TESTS: pending    RADIOLOGY & ADDITIONAL STUDIES: CTA- negative for PE. CTH- negative for acute changes.    ASSESSMENT & PLAN:
Patient is a 89y old  Female who presents with a chief complaint of Syncope  Encephalopathy (16 Jul 2020 10:35)      HPI:  89 year old female patient who was recently discharged from [facility] for a UTI and hyponatremia presented to the ED after having a witnessed episode of unresponsiveness that lasted roughly 20 mins according to pt's daughter/HCP(Ej [phone]). Patient noted to have heart rate in the 30s and blood sugar reading of 134 at the time of EMS arrival. No head trauma, tongue biting, mouth foaming complaints of chest pain, palpitations or dizziness noted prior to event. Patient initially confused at the time of ED presentation but found fully A&O x3 and endorses above narrative. States she remembers being with her daughter and son-in-law then waking up in the ED. Patient currently endorses several episodes of loose stools that started in the ED and low abdominal cramping. (14 Jul 2020 17:19)    Patient had some loose bowel movement day before yesterday.  Cause some rectal pain for her.  Yesterday, upon getting out of bed, noted blood dripping down her leg.  Discussed with nurse in details of this are not available to the nurse.  Patient denies any abdominal pain or any history of previous bleeding or new colonoscopies in the past.    Rectal exam reveals hemorrhoids and brown stool.      PAST MEDICAL & SURGICAL HISTORY:  TIA (transient ischemic attack)  Overactive bladder  Transient ischemic attack (TIA)  HTN (hypertension)      MEDICATIONS  (STANDING):  amLODIPine   Tablet 5 milliGRAM(s) Oral daily  ATENolol  Tablet 50 milliGRAM(s) Oral two times a day  enalapril 10 milliGRAM(s) Oral daily  pantoprazole  Injectable 40 milliGRAM(s) IV Push every 12 hours  sildenafil (REVATIO) 10 milliGRAM(s) Oral every 8 hours    MEDICATIONS  (PRN):  ALBUTerol    90 MICROgram(s) HFA Inhaler 2 Puff(s) Inhalation every 6 hours PRN Shortness of Breath and/or Wheezing      Allergies    No Known Allergies    Intolerances        SOCIAL HISTORY:  neg drugs  FAMILY HISTORY:neg colon ca nd NC      REVIEW OF SYSTEMS:    CONSTITUTIONAL: No weakness, fevers or chills  EYES/ENT: No visual changes;  No vertigo or throat pain   NECK: No pain or stiffness  RESPIRATORY: No cough, wheezing, hemoptysis; No shortness of breath  CARDIOVASCULAR: No chest pain or palpitations  GENITOURINARY: No dysuria, frequency or hematuria  NEUROLOGICAL: No numbness or weakness  SKIN: No itching, burning, rashes, or lesions   All other review of systems is negative unless indicated above.    Vital Signs Last 24 Hrs  T(C): 36.4 (16 Jul 2020 10:01), Max: 37.2 (15 Jul 2020 15:59)  T(F): 97.6 (16 Jul 2020 10:01), Max: 99 (15 Jul 2020 15:59)  HR: 71 (15 Jul 2020 22:20) (68 - 72)  BP: 133/39 (15 Jul 2020 22:20) (126/39 - 146/54)  BP(mean): --  RR: 18 (16 Jul 2020 05:12) (17 - 18)  SpO2: 100% (16 Jul 2020 05:12) (99% - 100%)    PHYSICAL EXAM:    Constitutional: NAD, well-developed  HEENT: EOMI, throat clear  Neck: No LAD, supple  Respiratory: CTA and P  Cardiovascular: S1 and S2, RRR, no M  Gastrointestinal: BS+, soft, NT/ND, neg HSM,  Extremities: No peripheral edema, neg clubing, cyanosis  Vascular: 2+ peripheral pulses  Neurological: A/O x 3, no focal deficits  Psychiatric: Normal mood, normal affect  Skin: No rashes    LABS:  CBC Full  -  ( 16 Jul 2020 07:04 )  WBC Count : 20.88 K/uL  RBC Count : 3.30 M/uL  Hemoglobin : 9.6 g/dL  Hematocrit : 28.8 %  Platelet Count - Automated : 222 K/uL  Mean Cell Volume : 87.3 fl  Mean Cell Hemoglobin : 29.1 pg  Mean Cell Hemoglobin Concentration : 33.3 gm/dL  Auto Neutrophil # : x  Auto Lymphocyte # : x  Auto Monocyte # : x  Auto Eosinophil # : x  Auto Basophil # : x  Auto Neutrophil % : x  Auto Lymphocyte % : x  Auto Monocyte % : x  Auto Eosinophil % : x  Auto Basophil % : x    07-16    136  |  106  |  14  ----------------------------<  95  3.7   |  26  |  0.83    Ca    8.6      16 Jul 2020 07:04    TPro  6.4  /  Alb  3.1<L>  /  TBili  0.3  /  DBili  x   /  AST  20  /  ALT  20  /  AlkPhos  80  07-14    PT/INR - ( 14 Jul 2020 12:50 )   PT: 11.8 sec;   INR: 1.02 ratio         PTT - ( 14 Jul 2020 12:50 )  PTT:26.4 sec        RADIOLOGY & ADDITIONAL STUDIES:  < from: CT Angio Chest PE Protocol w/ IV Cont (07.14.20 @ 15:35) >  EXAM:  CTA CHEST PE PROTOCOL (W)AW IC                            PROCEDURE DATE:  07/14/2020          INTERPRETATION:  CLINICAL INFORMATION: Syncope and shortness of breath    COMPARISON: None.    PROCEDURE:   CT Angiography of the Chest.  90 ml ofOmnipaque 350 was injected intravenously. 10 ml were discarded.  Sagittal and coronal reformats were performed as well as 3D (MIP) reconstructions.    FINDINGS:    LUNGS AND AIRWAYS: Patent central airways.  No suspicious nodule, mass or acute consolidation. Right lower lobe calcified granuloma.  PLEURA: No pleural effusion.  MEDIASTINUM AND PRUDENCIO: No lymphadenopathy. Large hiatal hernia.  VESSELS: Excellent pulmonary arterial opacification. No evidence of embolism. Normal caliber aorta. Atherosclerotic arterial calcifications, including coronary artery calcification.  HEART: Heart size is normal. No pericardial effusion.  CHEST WALL AND LOWER NECK: Within normal limits.  VISUALIZED UPPER ABDOMEN: Within normal limits.  BONES: No acute abnormality.    IMPRESSION:   No pulmonary embolism or other acute chest pathology.                  CHRIS GUILLORY M.D.,ATTENDING RADIOLOGIST  This document has been electronically signed. Jul 14 2020  3:50PM    < end of copied text >
Patient is a 89y old  Female who presents with a chief complaint of Syncope  Encephalopathy (2020 10:55)    HPI:  89 y/o female with h/o HTN, overactive bladder, TIA was admitted on  after a syncopal episode. She was recently discharged from [facility] for a UTI and hyponatremia presented to the ED after having a witnessed episode of unresponsiveness that lasted roughly 20 mins according to pt's daughter. Patient noted to have heart rate in the 30s and blood sugar reading of 134 at the time of EMS arrival. No head trauma, tongue biting, mouth foaming complaints of chest pain, palpitations or dizziness noted prior to event. Patient initially confused at the time of ED presentation but later she became A&O x3 Patient had several episodes of loose stools in the ED and low abdominal cramping. No further abdominal pain and loose stools overnight and today.    PAST MEDICAL HISTORY:  HTN (hypertension)   Overactive bladder   Transient ischemic attack (TIA).    PSH: as above  Meds: per reconciliation sheet, noted below  MEDICATIONS  (STANDING):  amLODIPine   Tablet 5 milliGRAM(s) Oral daily  ATENolol  Tablet 50 milliGRAM(s) Oral two times a day  enalapril 10 milliGRAM(s) Oral daily  pantoprazole  Injectable 40 milliGRAM(s) IV Push every 12 hours  sildenafil (REVATIO) 10 milliGRAM(s) Oral every 8 hours    MEDICATIONS  (PRN):  ALBUTerol    90 MICROgram(s) HFA Inhaler 2 Puff(s) Inhalation every 6 hours PRN Shortness of Breath and/or Wheezing    Allergies    No Known Allergies    Intolerances      Social: no smoking, no alcohol, no illegal drugs; no recent travel, no exposure to TB  FAMILY HISTORY:    no history of premature cardiovascular disease in first degree relatives    ROS: the patient denies fever, no chills, no HA, no seizures, no dizziness, no sore throat, no nasal congestion, no blurry vision, no CP, no palpitations, no SOB, no cough, no abdominal pain, has diarrhea, no N/V, no dysuria, no leg pain, no claudication, no rash, no joint aches, no rectal pain or bleeding, no night sweats  All other systems reviewed and are negative    Vital Signs Last 24 Hrs  T(C): 36.4 (2020 10:01), Max: 37.2 (15 Jul 2020 15:59)  T(F): 97.6 (2020 10:01), Max: 99 (15 Jul 2020 15:59)  HR: 71 (15 Jul 2020 22:20) (68 - 72)  BP: 133/39 (15 Jul 2020 22:20) (126/39 - 146/54)  BP(mean): --  RR: 18 (2020 05:12) (17 - 18)  SpO2: 100% (2020 05:12) (99% - 100%)  Daily     Daily     PE:    Constitutional:  No acute distress  HEENT: NC/AT, EOMI, PERRLA, conjunctivae clear; ears and nose atraumatic; pharynx benign  Neck: supple; thyroid not palpable  Back: no tenderness  Respiratory: respiratory effort normal; clear to auscultation  Cardiovascular: S1S2 regular, no murmurs  Abdomen: soft, not tender, not distended, positive BS; no liver or spleen organomegaly  Genitourinary: no suprapubic tenderness  Lymphatic: no LN palpable  Musculoskeletal: no muscle tenderness, no joint swelling or tenderness  Extremities: no pedal edema  Neurological/ Psychiatric: AxOx3, judgement and insight normal; moving all extremities  Skin: no rashes; no palpable lesions    Labs: all available labs reviewed                        9.6    20.88 )-----------( 222      ( 2020 07:04 )             28.8     07-16    136  |  106  |  14  ----------------------------<  95  3.7   |  26  |  0.83    Ca    8.6      2020 07:04    TPro  6.4  /  Alb  3.1<L>  /  TBili  0.3  /  DBili  x   /  AST  20  /  ALT  20  /  AlkPhos  80  07-14     LIVER FUNCTIONS - ( 2020 12:50 )  Alb: 3.1 g/dL / Pro: 6.4 gm/dL / ALK PHOS: 80 U/L / ALT: 20 U/L / AST: 20 U/L / GGT: x           Urinalysis Basic - ( 2020 23:45 )    Color: Yellow / Appearance: Clear / S.010 / pH: x  Gluc: x / Ketone: Negative  / Bili: Negative / Urobili: 1 mg/dL   Blood: x / Protein: 30 mg/dL / Nitrite: Positive   Leuk Esterase: Trace / RBC: 6-10 /HPF / WBC 3-5   Sq Epi: x / Non Sq Epi: Few / Bacteria: Moderate        COVID-19 PCR: NotDetec (20 @ 17:06)    Radiology: all available radiological tests reviewed    < from: CT Angio Chest PE Protocol w/ IV Cont (20 @ 15:35) >  LUNGS AND AIRWAYS: Patent central airways.  No suspicious nodule, mass or acute consolidation. Right lower lobe calcified granuloma.  PLEURA: No pleural effusion.  MEDIASTINUM AND PRUDENCIO: No lymphadenopathy. Large hiatal hernia.  VESSELS: Excellent pulmonary arterial opacification. No evidence of embolism. Normal caliber aorta. Atherosclerotic arterial calcifications, including coronary artery calcification.  HEART: Heart size is normal. No pericardial effusion.  CHEST WALL AND LOWER NECK: Within normal limits.  VISUALIZED UPPER ABDOMEN: Within normal limits.  BONES: No acute abnormality.    < end of copied text >      Advanced directives addressed: full resuscitation

## 2020-07-17 VITALS — HEART RATE: 69 BPM | DIASTOLIC BLOOD PRESSURE: 50 MMHG | SYSTOLIC BLOOD PRESSURE: 143 MMHG

## 2020-07-17 LAB
ANION GAP SERPL CALC-SCNC: 5 MMOL/L — SIGNIFICANT CHANGE UP (ref 5–17)
BUN SERPL-MCNC: 15 MG/DL — SIGNIFICANT CHANGE UP (ref 7–23)
CALCIUM SERPL-MCNC: 8.6 MG/DL — SIGNIFICANT CHANGE UP (ref 8.5–10.1)
CHLORIDE SERPL-SCNC: 107 MMOL/L — SIGNIFICANT CHANGE UP (ref 96–108)
CO2 SERPL-SCNC: 28 MMOL/L — SIGNIFICANT CHANGE UP (ref 22–31)
CREAT SERPL-MCNC: 0.98 MG/DL — SIGNIFICANT CHANGE UP (ref 0.5–1.3)
GLUCOSE SERPL-MCNC: 133 MG/DL — HIGH (ref 70–99)
HCT VFR BLD CALC: 29.8 % — LOW (ref 34.5–45)
HGB BLD-MCNC: 9.9 G/DL — LOW (ref 11.5–15.5)
MCHC RBC-ENTMCNC: 29.5 PG — SIGNIFICANT CHANGE UP (ref 27–34)
MCHC RBC-ENTMCNC: 33.2 GM/DL — SIGNIFICANT CHANGE UP (ref 32–36)
MCV RBC AUTO: 88.7 FL — SIGNIFICANT CHANGE UP (ref 80–100)
PLATELET # BLD AUTO: 257 K/UL — SIGNIFICANT CHANGE UP (ref 150–400)
POTASSIUM SERPL-MCNC: 3.8 MMOL/L — SIGNIFICANT CHANGE UP (ref 3.5–5.3)
POTASSIUM SERPL-SCNC: 3.8 MMOL/L — SIGNIFICANT CHANGE UP (ref 3.5–5.3)
RBC # BLD: 3.36 M/UL — LOW (ref 3.8–5.2)
RBC # FLD: 14.4 % — SIGNIFICANT CHANGE UP (ref 10.3–14.5)
SODIUM SERPL-SCNC: 140 MMOL/L — SIGNIFICANT CHANGE UP (ref 135–145)
WBC # BLD: 16.25 K/UL — HIGH (ref 3.8–10.5)
WBC # FLD AUTO: 16.25 K/UL — HIGH (ref 3.8–10.5)

## 2020-07-17 PROCEDURE — 99239 HOSP IP/OBS DSCHRG MGMT >30: CPT

## 2020-07-17 RX ORDER — ENALAPRIL MALEATE AND HYDROCHLOROTHIAZIDE 10; 25 MG/1; MG/1
1 TABLET ORAL
Qty: 0 | Refills: 0 | DISCHARGE

## 2020-07-17 RX ADMIN — ATENOLOL 50 MILLIGRAM(S): 25 TABLET ORAL at 12:20

## 2020-07-17 RX ADMIN — Medication 10 MILLIGRAM(S): at 12:20

## 2020-07-17 RX ADMIN — Medication 10 MILLIGRAM(S): at 05:59

## 2020-07-17 RX ADMIN — AMLODIPINE BESYLATE 5 MILLIGRAM(S): 2.5 TABLET ORAL at 12:22

## 2020-07-17 NOTE — PROGRESS NOTE ADULT - SUBJECTIVE AND OBJECTIVE BOX
Patient is a 89y old  Female who presents with a chief complaint of Syncope  Encephalopathy (17 Jul 2020 08:30)      HPI:  89 year old female patient who was recently discharged from [facility] for a UTI and hyponatremia presented to the ED after having a witnessed episode of unresponsiveness that lasted roughly 20 mins according to pt's daughter/HCP(Ej [phone]). Patient noted to have heart rate in the 30s and blood sugar reading of 134 at the time of EMS arrival. No head trauma, tongue biting, mouth foaming complaints of chest pain, palpitations or dizziness noted prior to event. Patient initially confused at the time of ED presentation but found fully A&O x3 and endorses above narrative. States she remembers being with her daughter and son-in-law then waking up in the ED. Patient currently endorses several episodes of loose stools that started in the ED and low abdominal cramping. (14 Jul 2020 17:19)    no farther bleeding and no BM in 2 d  neg N V  gopal diet and has some fatigue  PAST MEDICAL & SURGICAL HISTORY:  TIA (transient ischemic attack)  Overactive bladder  Transient ischemic attack (TIA)  HTN (hypertension)      MEDICATIONS  (STANDING):  amLODIPine   Tablet 5 milliGRAM(s) Oral daily  ATENolol  Tablet 50 milliGRAM(s) Oral two times a day  enalapril 10 milliGRAM(s) Oral daily  sildenafil (REVATIO) 10 milliGRAM(s) Oral every 8 hours    MEDICATIONS  (PRN):  ALBUTerol    90 MICROgram(s) HFA Inhaler 2 Puff(s) Inhalation every 6 hours PRN Shortness of Breath and/or Wheezing      Allergies    No Known Allergies    Intolerances        SOCIAL HISTORY:NC    FAMILY HISTORY:NC      REVIEW OF SYSTEMS:    CONSTITUTIONAL: No weakness, fevers or chills  EYES/ENT: No visual changes;  No vertigo or throat pain   NECK: No pain or stiffness  RESPIRATORY: No cough, wheezing, hemoptysis; No shortness of breath  CARDIOVASCULAR: No chest pain or palpitations  GENITOURINARY: No dysuria, frequency or hematuria  NEUROLOGICAL: No numbness or weakness  SKIN: No itching, burning, rashes, or lesions   All other review of systems is negative unless indicated above.    Vital Signs Last 24 Hrs  T(C): 36.7 (17 Jul 2020 07:40), Max: 36.7 (17 Jul 2020 07:40)  T(F): 98.1 (17 Jul 2020 07:40), Max: 98.1 (17 Jul 2020 07:40)  HR: 70 (17 Jul 2020 07:40) (59 - 77)  BP: 166/119 (17 Jul 2020 07:40) (117/50 - 166/119)  BP(mean): --  RR: 18 (17 Jul 2020 07:40) (18 - 18)  SpO2: 99% (17 Jul 2020 07:40) (97% - 99%)    PHYSICAL EXAM:    Constitutional: NAD, well-developed  HEENT: EOMI, throat clear  Neck: No LAD, supple  Respiratory: CTA and P  Cardiovascular: S1 and S2, RRR, no M  Gastrointestinal: BS+, soft, NT/ND, neg HSM,  Extremities: No peripheral edema, neg clubing, cyanosis  Vascular: 2+ peripheral pulses  Neurological: A/O x 3, no focal deficits  Psychiatric: Normal mood, normal affect  Skin: No rashes    LABS:  CBC Full  -  ( 16 Jul 2020 07:04 )  WBC Count : 20.88 K/uL  RBC Count : 3.30 M/uL  Hemoglobin : 9.6 g/dL  Hematocrit : 28.8 %  Platelet Count - Automated : 222 K/uL  Mean Cell Volume : 87.3 fl  Mean Cell Hemoglobin : 29.1 pg  Mean Cell Hemoglobin Concentration : 33.3 gm/dL  Auto Neutrophil # : x  Auto Lymphocyte # : x  Auto Monocyte # : x  Auto Eosinophil # : x  Auto Basophil # : x  Auto Neutrophil % : x  Auto Lymphocyte % : x  Auto Monocyte % : x  Auto Eosinophil % : x  Auto Basophil % : x    07-16    136  |  106  |  14  ----------------------------<  95  3.7   |  26  |  0.83    Ca    8.6      16 Jul 2020 07:04              RADIOLOGY & ADDITIONAL STUDIES:

## 2020-07-17 NOTE — DISCHARGE NOTE PROVIDER - NSDCCPTREATMENT_GEN_ALL_CORE_FT
PRINCIPAL PROCEDURE  Procedure: 2D echo  Findings and Treatment: The left ventricle was not well visualized.   Normal left ventricular size.   Left ventricular wall motion is normal.   Estimated left ventricular ejection fraction is 60 %.   Mild diastolic dysfunction (reversal of mitral inflow)   with borderline elevated LV filling pressures   The left atrium is mildly dilated.   Trivial aortic insufficiency   Mild mitral regurgitation   Moderate Tricuspid regurgitation   Moderate pulmonary hypertension.

## 2020-07-17 NOTE — PROGRESS NOTE ADULT - ASSESSMENT
89 y/o female with h/o HTN, overactive bladder, TIA was admitted on 7/14 after a syncopal episode. She was recently discharged from [facility] for a UTI and hyponatremia presented to the ED after having a witnessed episode of unresponsiveness that lasted roughly 20 mins according to pt's daughter. Patient noted to have heart rate in the 30s and blood sugar reading of 134 at the time of EMS arrival. No head trauma, tongue biting, mouth foaming complaints of chest pain, palpitations or dizziness noted prior to event. Patient initially confused at the time of ED presentation but later she became A&O x3 Patient had several episodes of loose stools in the ED and low abdominal cramping.     1. Syncopal episode. Low grade fever resolved. Leukocytosis. ?cause  -had prior episode of loose stools and had recently been on abx therapy for a urinary infection; she is at risk for CDAD  -no further loose stools reported - doubt CDAD  -cultures are negative to date  -no clinical evidence of pneumonia  -f/u BC x 2, urine c/s  -would continue to observe off abx   -monitor temps  -f/u CBC  -supportive care  2. Other issues:   -care per medicine

## 2020-07-17 NOTE — PROGRESS NOTE ADULT - SUBJECTIVE AND OBJECTIVE BOX
Date of service: 20 @ 08:31    Sitting in bed in NAD  No loose stools reported in last 24 hours  No SOB or pain    ROS: no fever or chills; denies dizziness, no HA, no SOB or cough, no abdominal pain, no dysuria, no legs pain, no rashes    MEDICATIONS  (STANDING):  amLODIPine   Tablet 5 milliGRAM(s) Oral daily  ATENolol  Tablet 50 milliGRAM(s) Oral two times a day  enalapril 10 milliGRAM(s) Oral daily  sildenafil (REVATIO) 10 milliGRAM(s) Oral every 8 hours    Vital Signs Last 24 Hrs  T(C): 36.7 (2020 07:40), Max: 36.7 (2020 07:40)  T(F): 98.1 (2020 07:40), Max: 98.1 (2020 07:40)  HR: 70 (2020 07:40) (59 - 77)  BP: 166/119 (2020 07:40) (117/50 - 166/119)  BP(mean): --  RR: 18 (2020 07:40) (18 - 18)  SpO2: 99% (2020 07:40) (97% - 99%)     Physical exam:    Constitutional:  No acute distress  HEENT: NC/AT, EOMI, PERRLA, conjunctivae clear  Neck: supple; thyroid not palpable  Back: no tenderness  Respiratory: respiratory effort normal; clear to auscultation  Cardiovascular: S1S2 regular, no murmurs  Abdomen: soft, not tender, not distended, positive BS  Genitourinary: no suprapubic tenderness  Lymphatic: no LN palpable  Musculoskeletal: no muscle tenderness, no joint swelling or tenderness  Extremities: no pedal edema  Neurological/ Psychiatric: AxOx3, moving all extremities  Skin: no rashes; no palpable lesions    Labs: reviewed                        9.6    20.88 )-----------( 222      ( 2020 07:04 )             28.8     07-16    136  |  106  |  14  ----------------------------<  95  3.7   |  26  |  0.83    Ca    8.6      2020 07:04    D-Dimer Assay, Quantitative: <150 ng/mL DDU (20 @ 12:50)       LIVER FUNCTIONS - ( 2020 12:50 )  Alb: 3.1 g/dL / Pro: 6.4 gm/dL / ALK PHOS: 80 U/L / ALT: 20 U/L / AST: 20 U/L / GGT: x           Urinalysis Basic - ( 2020 23:45 )    Color: Yellow / Appearance: Clear / S.010 / pH: x  Gluc: x / Ketone: Negative  / Bili: Negative / Urobili: 1 mg/dL   Blood: x / Protein: 30 mg/dL / Nitrite: Positive   Leuk Esterase: Trace / RBC: 6-10 /HPF / WBC 3-5   Sq Epi: x / Non Sq Epi: Few / Bacteria: Moderate      Culture - Urine (collected 15 Jul 2020 18:00)  Source: .Urine Clean Catch (Midstream)  Final Report (2020 23:32):    No growth    Culture - Blood (collected 2020 20:54)  Source: .Blood Blood  Preliminary Report (2020 06:01):    No growth to date.    Culture - Blood (collected 2020 20:54)  Source: .Blood Blood  Preliminary Report (2020 06:01):    No growth to date.    COVID-19 PCR: NotDetec (20 @ 17:06)    Radiology: all available radiological tests reviewed    < from: CT Angio Chest PE Protocol w/ IV Cont (20 @ 15:35) >  LUNGS AND AIRWAYS: Patent central airways.  No suspicious nodule, mass or acute consolidation. Right lower lobe calcified granuloma.  PLEURA: No pleural effusion.  MEDIASTINUM AND PRUDENCIO: No lymphadenopathy. Large hiatal hernia.  VESSELS: Excellent pulmonary arterial opacification. No evidence of embolism. Normal caliber aorta. Atherosclerotic arterial calcifications, including coronary artery calcification.  HEART: Heart size is normal. No pericardial effusion.  CHEST WALL AND LOWER NECK: Within normal limits.  VISUALIZED UPPER ABDOMEN: Within normal limits.  BONES: No acute abnormality.    < end of copied text >      Advanced directives addressed: full resuscitation

## 2020-07-17 NOTE — PROGRESS NOTE ADULT - SUBJECTIVE AND OBJECTIVE BOX
Patient is a 89y old  Female who presents with a chief complaint of Syncope  Encephalopathy       HPI:  89 year old female patient who was recently discharged from [facility] for a UTI and hyponatremia presented to the ED after having a witnessed episode of unresponsiveness.   -   Pt seen and examined by me this am. She denies any symptoms.  Overnight  no events.   -pt states she had diarrhea at presentation but none now. No issues overnight.         PAST MEDICAL & SURGICAL HISTORY:  TIA (transient ischemic attack)  Overactive bladder  Transient ischemic attack (TIA)  HTN (hypertension)      MEDICATIONS  (STANDING):  amLODIPine   Tablet 5 milliGRAM(s) Oral daily  ATENolol  Tablet 50 milliGRAM(s) Oral two times a day  enalapril 10 milliGRAM(s) Oral daily  pantoprazole  Injectable 40 milliGRAM(s) IV Push every 12 hours  sildenafil (REVATIO) 10 milliGRAM(s) Oral every 8 hours    MEDICATIONS  (PRN):  ALBUTerol    90 MICROgram(s) HFA Inhaler 2 Puff(s) Inhalation every 6 hours PRN Shortness of Breath and/or Wheezing      FAMILY HISTORY:      SOCIAL HISTORY: no recent smoking     REVIEW OF SYSTEMS:  CONSTITUTIONAL:    No fatigue, malaise, lethargy.  No fever or chills.   RESPIRATORY:  No cough.  No wheeze.  No hemoptysis.  No shortness of breath.  CARDIOVASCULAR:  No chest pains.  No palpitations. No shortness of breath, No orthopnea or PND.  GASTROINTESTINAL:  No abdominal pain.  No nausea or vomiting.    GENITOURINARY:    No hematuria.    MUSCULOSKELETAL:  No musculoskeletal pain.  No joint swelling.  No arthritis.  NEUROLOGICAL:  No tingling or numbness or weakness.  PSYCHIATRIC:  No confusion  SKIN:  No rashes.          ICU Vital Signs Last 24 Hrs  T(C): 36.4 (2020 05:40), Max: 36.6 (2020 22:16)  T(F): 97.6 (2020 05:40), Max: 97.9 (2020 22:16)  HR: 59 (2020 05:40) (59 - 77)  BP: 159/55 (2020 05:40) (117/50 - 159/55)  BP(mean): --  ABP: --  ABP(mean): --  RR: 18 (2020 05:40) (18 - 18)  SpO2: 98% (2020 05:40) (97% - 98%)    PHYSICAL EXAM-    Constitutional: The patient appears to be normal, well developed, well nourished and alert and oriented to time, place and person. The patient does not appear acutely ill.     Head: Head is normocephalic and atraumatic.      Neck: No jugular venous distention. No audible carotid bruits. There are strong carotid pulses bilaterally. No JVD.     Cardiovascular: Regular rate and rhythm without S3, S4. No murmurs or rubs are appreciated.      Respiratory: Breath sounds are normal. No rales. No wheezing.    Abdomen: Soft, nontender, nondistended with positive bowel sounds.      Extremity: No tenderness. No  pitting edema     Neurologic: The patient is alert and oriented.      Skin: No rash, no obvious lesions noted.      Psychiatric: The patient appears to be emotionally stable.      INTERPRETATION OF TELEMETRY: SR now,     ECG:     I&O's Detail    15 Jul 2020 07:01  -  2020 07:00  --------------------------------------------------------  IN:    Oral Fluid: 240 mL  Total IN: 240 mL    OUT:    Voided: 125 mL  Total OUT: 125 mL    Total NET: 115 mL          LABS:                                   9.6    20.88 )-----------( 222      ( 2020 07:04 )             28.8     07-16    136  |  106  |  14  ----------------------------<  95  3.7   |  26  |  0.83    Ca    8.6      2020 07:04             PTT - ( 2020 12:50 )  PTT:26.4 sec  Urinalysis Basic - ( 2020 23:45 )    Color: Yellow / Appearance: Clear / S.010 / pH: x  Gluc: x / Ketone: Negative  / Bili: Negative / Urobili: 1 mg/dL   Blood: x / Protein: 30 mg/dL / Nitrite: Positive   Leuk Esterase: Trace / RBC: 6-10 /HPF / WBC 3-5   Sq Epi: x / Non Sq Epi: Few / Bacteria: Moderate      I&O's Summary    15 Jul 2020 07:01  -  2020 07:00  --------------------------------------------------------  IN: 240 mL / OUT: 125 mL / NET: 115 mL      BNP  RADIOLOGY & ADDITIONAL STUDIES:  < from: CT Angio Chest PE Protocol w/ IV Cont (20 @ 15:35) >    EXAM:  CTA CHEST PE PROTOCOL (W)AW IC                            PROCEDURE DATE:  2020          INTERPRETATION:  CLINICAL INFORMATION: Syncope and shortness of breath    COMPARISON: None.    PROCEDURE:   CT Angiography of the Chest.  90 ml ofOmnipaque 350 was injected intravenously. 10 ml were discarded.  Sagittal and coronal reformats were performed as well as 3D (MIP) reconstructions.    FINDINGS:    LUNGS AND AIRWAYS: Patent central airways.  No suspicious nodule, mass or acute consolidation. Right lower lobe calcified granuloma.  PLEURA: No pleural effusion.  MEDIASTINUM AND PRUDENCIO: No lymphadenopathy. Large hiatal hernia.  VESSELS: Excellent pulmonary arterial opacification. No evidence of embolism. Normal caliber aorta. Atherosclerotic arterial calcifications, including coronary artery calcification.  HEART: Heart size is normal. No pericardial effusion.  CHEST WALL AND LOWER NECK: Within normal limits.  VISUALIZED UPPER ABDOMEN: Within normal limits.  BONES: No acute abnormality.    IMPRESSION:   No pulmonary embolism or other acute chest pathology.    < end of copied text >  < from: TTE Echo Complete w/o Contrast w/ Doppler (07.15.20 @ 11:56) >   Impression     Summary     The left ventricle was not well visualized.   Normal left ventricular size.   Left ventricular wall motion is normal.   Estimated left ventricular ejection fraction is 60 %.   Mild diastolic dysfunction (reversal of mitral inflow)   with borderline elevated LV filling pressures   The left atrium is mildly dilated.   Trivial aortic insufficiency   Mild mitral regurgitation   Moderate Tricuspid regurgitation   Moderate pulmonary hypertension.     Signature     ----------------------------------------------------------------   Electronically signed by Jeremy Harp MD(Interpreting   physician) on 07/15/2020 05:47 PM   ----------------------------------------------------------------    < end of copied text >

## 2020-07-17 NOTE — PROGRESS NOTE ADULT - REASON FOR ADMISSION
Syncope  Encephalopathy

## 2020-07-17 NOTE — PROGRESS NOTE ADULT - ASSESSMENT
Syncope- orthostatic hypotension.  no events on tele so far.  Recheck orthostatic BP this am.  Recommend gentle hydration if still orthostatic.    Echo results as stated above.    HTN-continue current meds.  BP optimal.   no on any diuretics.    Diarrhea- resolved.    Pulmonary hypertension- unclear what group she is and what workup was done in past .  She is on sildenafil as outpt   Will make her followup with her cardiologist at [place].       Other medical issues- Management per primary team.   Thank you for allowing me to participate in the care of this patient. Please feel free to contact me with any questions.

## 2020-07-17 NOTE — PROGRESS NOTE ADULT - ASSESSMENT
Anal rectal bleeding.  Differential discussed with patient.  Possibility of a more proximal lesion also discussed.  Understanding this and accepting risk of misdiagnosis including that of cancer, patient does not want a colonoscopy at this time and wants to see if she cont to bleed    Discussed with RN, we will clinically follow and see if there is any further bleeding or significant drop in hematocrit.  Syncope- orthostatic hypotension.

## 2020-07-17 NOTE — DISCHARGE NOTE PROVIDER - HOSPITAL COURSE
89 year old female patient  with PMH of HTN, TIA, Overactive blader  who was recently discharged from [facility] for a UTI and hyponatremia presented to the ED after having a witnessed episode of unresponsiveness that lasted roughly 20 mins according to pt's daughter/ HCP ( Margy [phone]).  Patient  noted to have heart rate in the 30s and blood sugar reading of 134 at the time of EMS arrival. No head trauma, tongue biting, mouth foaming complaints of chest pain, palpitations or dizziness noted prior to event. Patient initially confused at the time of ED presentation but found fully A&O x3 and endorses above narrative. States she remembers being with her daughter and son-in-law then waking up in the ED        INTERVAL HPI/ OVERNIGHT EVENTS: Chart reviewed, Pt was seen and examined, confirms history above, stated that was sitting in the chair when it happened.  Denies any preceding dizziness , lightheadedness or palpitations. Feels better today. Had  episode of rectal bleeding this am, resolved. Had  significant diarrhea yesterday in ED. Denies abd pain         7/17: seen and eval. Hemodynamically stable. WBC trending down. Labs and vitals reviewed.

## 2020-07-17 NOTE — DISCHARGE NOTE PROVIDER - NSDCMRMEDTOKEN_GEN_ALL_CORE_FT
amLODIPine 5 mg oral tablet: 1 tab(s) orally once a day  atenolol 50 mg oral tablet: 1 tab(s) orally 2 times a day  enalapril 10 mg oral tablet: 1 tab(s) orally once a day  enalapril 10 mg oral tablet: 1 tab(s) orally once a day  omeprazole 40 mg oral delayed release capsule: 1 cap(s) orally once a day  sildenafil 25 mg oral tablet: 0.5 tab(s) orally 3 times a day  Trelegy Ellipta inhalation powder: 1 puff(s) inhaled once a day

## 2020-07-17 NOTE — DISCHARGE NOTE PROVIDER - NSDCCPCAREPLAN_GEN_ALL_CORE_FT
PRINCIPAL DISCHARGE DIAGNOSIS  Diagnosis: Altered mental status, unspecified altered mental status type  Assessment and Plan of Treatment: # Syncope- orthostatic hypotension.  - no signs of malignant arrhythmias on telemetry  - recommended compression stockings  - make sure you get up slowly from sitting and lying down positions  - stay hydrated  - hold HCTZ till cardiology evaluation        SECONDARY DISCHARGE DIAGNOSES  Diagnosis: Leukocytosis  Assessment and Plan of Treatment: - no signs of infection  - abdominal sonogram for the evaluation of kidneys showed no infection  - urine did not grow any bacteria  - no diarrhea  - no coughing  - Discharge WBC 16, which came down from 20.  - close follow up with primary care physician as WBC needs to be followed up for resolution. If any fevers, chills please come back in the hospital PRINCIPAL DISCHARGE DIAGNOSIS  Diagnosis: Altered mental status, unspecified altered mental status type  Assessment and Plan of Treatment: # Syncope- orthostatic hypotension.  - no signs of malignant arrhythmias on telemetry  - recommended compression stockings  - make sure you get up slowly from sitting and lying down positions  - stay hydrated  - hold HCTZ till cardiology evaluation        SECONDARY DISCHARGE DIAGNOSES  Diagnosis: Anal bleeding  Assessment and Plan of Treatment: - Differential discussed with patient.  Possibility of a more proximal lesion also discussed.  Dr. Hills discussed coloscopy - patient does not wants colonoscopy for now. Needs to be followed up with primary [place]   - no further rectal bleed  - discharge [facility]    Diagnosis: Leukocytosis  Assessment and Plan of Treatment: - no signs of infection  - abdominal sonogram for the evaluation of kidneys showed no infection  - urine did not grow any bacteria  - no diarrhea  - no coughing  - Discharge WBC 16, which came down from 20.  - close follow up with primary care physician as WBC needs to be followed up for resolution. If any fevers, chills please come back in the hospital

## 2020-07-17 NOTE — DISCHARGE NOTE NURSING/CASE MANAGEMENT/SOCIAL WORK - PATIENT PORTAL LINK FT
You can access the FollowMyHealth Patient Portal offered by [facility] by registering at the following website: [email or web address]. By joining [employer]’s FollowMyHealth portal, you will also be able to view your health information using other applications (apps) compatible with our system.

## 2020-07-20 LAB
CULTURE RESULTS: SIGNIFICANT CHANGE UP
CULTURE RESULTS: SIGNIFICANT CHANGE UP
SPECIMEN SOURCE: SIGNIFICANT CHANGE UP
SPECIMEN SOURCE: SIGNIFICANT CHANGE UP

## 2020-07-21 DIAGNOSIS — K62.5 HEMORRHAGE OF ANUS AND RECTUM: ICD-10-CM

## 2020-07-21 DIAGNOSIS — K64.9 UNSPECIFIED HEMORRHOIDS: ICD-10-CM

## 2020-07-21 DIAGNOSIS — Z86.73 PERSONAL HISTORY OF TRANSIENT ISCHEMIC ATTACK (TIA), AND CEREBRAL INFARCTION WITHOUT RESIDUAL DEFICITS: ICD-10-CM

## 2020-07-21 DIAGNOSIS — R19.7 DIARRHEA, UNSPECIFIED: ICD-10-CM

## 2020-07-21 DIAGNOSIS — G93.41 METABOLIC ENCEPHALOPATHY: ICD-10-CM

## 2020-07-21 DIAGNOSIS — D50.0 IRON DEFICIENCY ANEMIA SECONDARY TO BLOOD LOSS (CHRONIC): ICD-10-CM

## 2020-07-21 DIAGNOSIS — I10 ESSENTIAL (PRIMARY) HYPERTENSION: ICD-10-CM

## 2020-07-21 DIAGNOSIS — I27.20 PULMONARY HYPERTENSION, UNSPECIFIED: ICD-10-CM

## 2020-07-21 DIAGNOSIS — R55 SYNCOPE AND COLLAPSE: ICD-10-CM

## 2020-07-21 DIAGNOSIS — E87.1 HYPO-OSMOLALITY AND HYPONATREMIA: ICD-10-CM

## 2020-07-21 DIAGNOSIS — E86.0 DEHYDRATION: ICD-10-CM

## 2021-03-05 NOTE — ED ADULT NURSE NOTE - CAS TRG GEN SKIN CONDITION
You can access the FollowMyHealth Patient Portal offered by  by registering at the following website: [email or web address]. By joining [employer]’s FollowMyHealth portal, you will also be able to view your health information using other applications (apps) compatible with our system. Warm

## 2023-01-18 NOTE — ED ADULT NURSE NOTE - NS ED PATIENT SAFETY CONCERN
cardiac precautions/fall precautions/sternal precautions cardiac precautions/fall precautions/sternal precautions/surgical precautions No

## 2024-12-23 NOTE — ED ADULT NURSE NOTE - NSFALLRSKOUTCOME_ED_ALL_ED
Reports rapid heart rtate and shortness of breath this morningednorses slight \"tightness\" across chest   Universal Safety Interventions
